# Patient Record
Sex: MALE | Race: WHITE
[De-identification: names, ages, dates, MRNs, and addresses within clinical notes are randomized per-mention and may not be internally consistent; named-entity substitution may affect disease eponyms.]

---

## 2017-12-25 ENCOUNTER — HOSPITAL ENCOUNTER (INPATIENT)
Dept: HOSPITAL 95 - ER | Age: 62
LOS: 7 days | Discharge: HOME | DRG: 281 | End: 2018-01-01
Attending: FAMILY MEDICINE | Admitting: FAMILY MEDICINE
Payer: COMMERCIAL

## 2017-12-25 VITALS — WEIGHT: 237.44 LBS | BODY MASS INDEX: 35.17 KG/M2 | HEIGHT: 69.02 IN

## 2017-12-25 DIAGNOSIS — Z86.010: ICD-10-CM

## 2017-12-25 DIAGNOSIS — Z91.14: ICD-10-CM

## 2017-12-25 DIAGNOSIS — Z79.899: ICD-10-CM

## 2017-12-25 DIAGNOSIS — R00.0: ICD-10-CM

## 2017-12-25 DIAGNOSIS — I25.10: ICD-10-CM

## 2017-12-25 DIAGNOSIS — K21.9: ICD-10-CM

## 2017-12-25 DIAGNOSIS — G47.33: ICD-10-CM

## 2017-12-25 DIAGNOSIS — F17.220: ICD-10-CM

## 2017-12-25 DIAGNOSIS — E78.00: ICD-10-CM

## 2017-12-25 DIAGNOSIS — E11.9: ICD-10-CM

## 2017-12-25 DIAGNOSIS — I21.4: ICD-10-CM

## 2017-12-25 DIAGNOSIS — I50.40: ICD-10-CM

## 2017-12-25 DIAGNOSIS — I42.9: ICD-10-CM

## 2017-12-25 DIAGNOSIS — I11.0: ICD-10-CM

## 2017-12-25 DIAGNOSIS — N17.9: ICD-10-CM

## 2017-12-25 DIAGNOSIS — E03.9: ICD-10-CM

## 2017-12-25 DIAGNOSIS — Z79.84: ICD-10-CM

## 2017-12-25 DIAGNOSIS — Z79.82: ICD-10-CM

## 2017-12-25 DIAGNOSIS — D50.0: ICD-10-CM

## 2017-12-25 DIAGNOSIS — E66.9: ICD-10-CM

## 2017-12-25 DIAGNOSIS — G89.29: ICD-10-CM

## 2017-12-25 DIAGNOSIS — I48.92: Primary | ICD-10-CM

## 2017-12-25 DIAGNOSIS — M54.9: ICD-10-CM

## 2017-12-25 DIAGNOSIS — I44.30: ICD-10-CM

## 2017-12-25 LAB
ALBUMIN SERPL BCP-MCNC: 3.4 G/DL (ref 3.4–5)
ALBUMIN/GLOB SERPL: 1 {RATIO} (ref 0.8–1.8)
ALT SERPL W P-5'-P-CCNC: 115 U/L (ref 12–78)
ANION GAP SERPL CALCULATED.4IONS-SCNC: 9 MMOL/L (ref 6–16)
AST SERPL W P-5'-P-CCNC: 98 U/L (ref 12–37)
BASOPHILS # BLD AUTO: 0.02 K/MM3 (ref 0–0.23)
BASOPHILS NFR BLD AUTO: 0 % (ref 0–2)
BILIRUB SERPL-MCNC: 0.4 MG/DL (ref 0.1–1)
BUN SERPL-MCNC: 16 MG/DL (ref 8–24)
CALCIUM SERPL-MCNC: 8.5 MG/DL (ref 8.5–10.1)
CHLORIDE SERPL-SCNC: 104 MMOL/L (ref 98–108)
CO2 SERPL-SCNC: 26 MMOL/L (ref 21–32)
CREAT SERPL-MCNC: 1.29 MG/DL (ref 0.6–1.2)
DEPRECATED RDW RBC AUTO: 55.9 FL (ref 35.1–46.3)
EOSINOPHIL # BLD AUTO: 0.44 K/MM3 (ref 0–0.68)
EOSINOPHIL NFR BLD AUTO: 8 % (ref 0–6)
ERYTHROCYTE [DISTWIDTH] IN BLOOD BY AUTOMATED COUNT: 14.5 % (ref 11.7–14.2)
GLOBULIN SER CALC-MCNC: 3.4 G/DL (ref 2.2–4)
GLUCOSE SERPL-MCNC: 153 MG/DL (ref 70–99)
HCT VFR BLD AUTO: 36.4 % (ref 37–53)
HGB BLD-MCNC: 12.3 G/DL (ref 13.5–17.5)
IMM GRANULOCYTES # BLD AUTO: 0.01 K/MM3 (ref 0–0.1)
IMM GRANULOCYTES NFR BLD AUTO: 0 % (ref 0–1)
LYMPHOCYTES # BLD AUTO: 1.45 K/MM3 (ref 0.84–5.2)
LYMPHOCYTES NFR BLD AUTO: 27 % (ref 21–46)
MAGNESIUM SERPL-MCNC: 2 MG/DL (ref 1.6–2.4)
MCHC RBC AUTO-ENTMCNC: 33.8 G/DL (ref 31.5–36.5)
MCV RBC AUTO: 105 FL (ref 80–100)
MONOCYTES # BLD AUTO: 0.5 K/MM3 (ref 0.16–1.47)
MONOCYTES NFR BLD AUTO: 9 % (ref 4–13)
NEUTROPHILS # BLD AUTO: 2.92 K/MM3 (ref 1.96–9.15)
NEUTROPHILS NFR BLD AUTO: 55 % (ref 41–73)
NRBC # BLD AUTO: 0 K/MM3 (ref 0–0.02)
NRBC BLD AUTO-RTO: 0 /100 WBC (ref 0–0.2)
PLATELET # BLD AUTO: 168 K/MM3 (ref 150–400)
POTASSIUM SERPL-SCNC: 3.8 MMOL/L (ref 3.5–5.5)
PROT SERPL-MCNC: 6.8 G/DL (ref 6.4–8.2)
SODIUM SERPL-SCNC: 139 MMOL/L (ref 136–145)
TSH SERPL DL<=0.005 MIU/L-ACNC: 4.88 UIU/ML (ref 0.36–4.8)

## 2017-12-26 LAB
ANION GAP SERPL CALCULATED.4IONS-SCNC: 7 MMOL/L (ref 6–16)
BASOPHILS # BLD AUTO: 0.04 K/MM3 (ref 0–0.23)
BASOPHILS NFR BLD AUTO: 1 % (ref 0–2)
BUN SERPL-MCNC: 16 MG/DL (ref 8–24)
CALCIUM SERPL-MCNC: 8 MG/DL (ref 8.5–10.1)
CHLORIDE SERPL-SCNC: 107 MMOL/L (ref 98–108)
CHOLEST SERPL-MCNC: 102 MG/DL (ref 50–200)
CHOLEST/HDLC SERPL: 3.1 {RATIO}
CK MB CFR SERPL CALC: 2.1 % (ref 0–4)
CK MB CFR SERPL CALC: 2.5 % (ref 0–4)
CK SERPL-CCNC: 51 U/L (ref 39–308)
CK SERPL-CCNC: 68 U/L (ref 39–308)
CO2 SERPL-SCNC: 26 MMOL/L (ref 21–32)
CREAT SERPL-MCNC: 1.05 MG/DL (ref 0.6–1.2)
DEPRECATED RDW RBC AUTO: 57 FL (ref 35.1–46.3)
EOSINOPHIL # BLD AUTO: 0.4 K/MM3 (ref 0–0.68)
EOSINOPHIL NFR BLD AUTO: 7 % (ref 0–6)
ERYTHROCYTE [DISTWIDTH] IN BLOOD BY AUTOMATED COUNT: 14.8 % (ref 11.7–14.2)
GLUCOSE SERPL-MCNC: 109 MG/DL (ref 70–99)
HCT VFR BLD AUTO: 33.3 % (ref 37–53)
HDLC SERPL-MCNC: 33 MG/DL (ref 39–?)
HGB BLD-MCNC: 11.1 G/DL (ref 13.5–17.5)
IMM GRANULOCYTES # BLD AUTO: 0.01 K/MM3 (ref 0–0.1)
IMM GRANULOCYTES NFR BLD AUTO: 0 % (ref 0–1)
LDLC SERPL CALC-MCNC: 45 MG/DL (ref 0–110)
LDLC/HDLC SERPL: 1.4 {RATIO}
LYMPHOCYTES # BLD AUTO: 1.38 K/MM3 (ref 0.84–5.2)
LYMPHOCYTES NFR BLD AUTO: 25 % (ref 21–46)
MAGNESIUM SERPL-MCNC: 2.2 MG/DL (ref 1.6–2.4)
MCHC RBC AUTO-ENTMCNC: 33.3 G/DL (ref 31.5–36.5)
MCV RBC AUTO: 106 FL (ref 80–100)
MONOCYTES # BLD AUTO: 0.62 K/MM3 (ref 0.16–1.47)
MONOCYTES NFR BLD AUTO: 11 % (ref 4–13)
NEUTROPHILS # BLD AUTO: 3.06 K/MM3 (ref 1.96–9.15)
NEUTROPHILS NFR BLD AUTO: 56 % (ref 41–73)
NRBC # BLD AUTO: 0 K/MM3 (ref 0–0.02)
NRBC BLD AUTO-RTO: 0 /100 WBC (ref 0–0.2)
PLATELET # BLD AUTO: 156 K/MM3 (ref 150–400)
POTASSIUM SERPL-SCNC: 3.7 MMOL/L (ref 3.5–5.5)
PROTHROMBIN TIME: 11.6 SEC (ref 9.7–11.5)
SODIUM SERPL-SCNC: 140 MMOL/L (ref 136–145)
TRIGL SERPL-MCNC: 118 MG/DL (ref 30–160)
TROPONIN I SERPL-MCNC: 0.26 NG/ML (ref 0–0.04)
TROPONIN I SERPL-MCNC: 0.35 NG/ML (ref 0–0.04)
VLDLC SERPL CALC-MCNC: 23 MG/DL (ref 6–32)

## 2017-12-26 PROCEDURE — 5A2204Z RESTORATION OF CARDIAC RHYTHM, SINGLE: ICD-10-PCS | Performed by: INTERNAL MEDICINE

## 2017-12-26 PROCEDURE — B246ZZ4 ULTRASONOGRAPHY OF RIGHT AND LEFT HEART, TRANSESOPHAGEAL: ICD-10-PCS | Performed by: INTERNAL MEDICINE

## 2017-12-27 LAB
ALBUMIN SERPL BCP-MCNC: 3.3 G/DL (ref 3.4–5)
ALBUMIN SERPL BCP-MCNC: 3.4 G/DL (ref 3.4–5)
ALBUMIN/GLOB SERPL: 0.9 {RATIO} (ref 0.8–1.8)
ALBUMIN/GLOB SERPL: 0.9 {RATIO} (ref 0.8–1.8)
ALT SERPL W P-5'-P-CCNC: 84 U/L (ref 12–78)
ALT SERPL W P-5'-P-CCNC: 89 U/L (ref 12–78)
ANION GAP SERPL CALCULATED.4IONS-SCNC: 6 MMOL/L (ref 6–16)
ANION GAP SERPL CALCULATED.4IONS-SCNC: 8 MMOL/L (ref 6–16)
AST SERPL W P-5'-P-CCNC: 39 U/L (ref 12–37)
AST SERPL W P-5'-P-CCNC: 40 U/L (ref 12–37)
BASOPHILS # BLD AUTO: 0.03 K/MM3 (ref 0–0.23)
BASOPHILS NFR BLD AUTO: 1 % (ref 0–2)
BILIRUB SERPL-MCNC: 0.6 MG/DL (ref 0.1–1)
BILIRUB SERPL-MCNC: 0.6 MG/DL (ref 0.1–1)
BUN SERPL-MCNC: 17 MG/DL (ref 8–24)
BUN SERPL-MCNC: 18 MG/DL (ref 8–24)
CALCIUM SERPL-MCNC: 8 MG/DL (ref 8.5–10.1)
CALCIUM SERPL-MCNC: 8.7 MG/DL (ref 8.5–10.1)
CHLORIDE SERPL-SCNC: 105 MMOL/L (ref 98–108)
CHLORIDE SERPL-SCNC: 105 MMOL/L (ref 98–108)
CO2 SERPL-SCNC: 25 MMOL/L (ref 21–32)
CO2 SERPL-SCNC: 27 MMOL/L (ref 21–32)
CREAT SERPL-MCNC: 0.95 MG/DL (ref 0.6–1.2)
CREAT SERPL-MCNC: 0.95 MG/DL (ref 0.6–1.2)
DEPRECATED RDW RBC AUTO: 57.4 FL (ref 35.1–46.3)
EOSINOPHIL # BLD AUTO: 0.5 K/MM3 (ref 0–0.68)
EOSINOPHIL NFR BLD AUTO: 8 % (ref 0–6)
ERYTHROCYTE [DISTWIDTH] IN BLOOD BY AUTOMATED COUNT: 14.8 % (ref 11.7–14.2)
GLOBULIN SER CALC-MCNC: 3.6 G/DL (ref 2.2–4)
GLOBULIN SER CALC-MCNC: 3.8 G/DL (ref 2.2–4)
GLUCOSE SERPL-MCNC: 120 MG/DL (ref 70–99)
GLUCOSE SERPL-MCNC: 98 MG/DL (ref 70–99)
HCT VFR BLD AUTO: 37.6 % (ref 37–53)
HGB BLD-MCNC: 12.3 G/DL (ref 13.5–17.5)
IMM GRANULOCYTES # BLD AUTO: 0.01 K/MM3 (ref 0–0.1)
IMM GRANULOCYTES NFR BLD AUTO: 0 % (ref 0–1)
LYMPHOCYTES # BLD AUTO: 1.46 K/MM3 (ref 0.84–5.2)
LYMPHOCYTES NFR BLD AUTO: 23 % (ref 21–46)
MCHC RBC AUTO-ENTMCNC: 32.7 G/DL (ref 31.5–36.5)
MCV RBC AUTO: 107 FL (ref 80–100)
MONOCYTES # BLD AUTO: 0.65 K/MM3 (ref 0.16–1.47)
MONOCYTES NFR BLD AUTO: 10 % (ref 4–13)
NEUTROPHILS # BLD AUTO: 3.8 K/MM3 (ref 1.96–9.15)
NEUTROPHILS NFR BLD AUTO: 59 % (ref 41–73)
NRBC # BLD AUTO: 0 K/MM3 (ref 0–0.02)
NRBC BLD AUTO-RTO: 0 /100 WBC (ref 0–0.2)
PLATELET # BLD AUTO: 167 K/MM3 (ref 150–400)
POTASSIUM SERPL-SCNC: 4.3 MMOL/L (ref 3.5–5.5)
POTASSIUM SERPL-SCNC: 4.3 MMOL/L (ref 3.5–5.5)
PROT SERPL-MCNC: 7 G/DL (ref 6.4–8.2)
PROT SERPL-MCNC: 7.1 G/DL (ref 6.4–8.2)
PROTHROMBIN TIME: 13.2 SEC (ref 9.7–11.5)
SODIUM SERPL-SCNC: 136 MMOL/L (ref 136–145)
SODIUM SERPL-SCNC: 140 MMOL/L (ref 136–145)
TROPONIN I SERPL-MCNC: 0.37 NG/ML (ref 0–0.04)

## 2017-12-28 LAB
ALBUMIN SERPL BCP-MCNC: 3.3 G/DL (ref 3.4–5)
ALBUMIN/GLOB SERPL: 0.9 {RATIO} (ref 0.8–1.8)
ALT SERPL W P-5'-P-CCNC: 61 U/L (ref 12–78)
ANION GAP SERPL CALCULATED.4IONS-SCNC: 6 MMOL/L (ref 6–16)
AST SERPL W P-5'-P-CCNC: 27 U/L (ref 12–37)
BASOPHILS # BLD AUTO: 0.02 K/MM3 (ref 0–0.23)
BASOPHILS NFR BLD AUTO: 0 % (ref 0–2)
BILIRUB SERPL-MCNC: 0.7 MG/DL (ref 0.1–1)
BUN SERPL-MCNC: 14 MG/DL (ref 8–24)
CALCIUM SERPL-MCNC: 8.9 MG/DL (ref 8.5–10.1)
CHLORIDE SERPL-SCNC: 102 MMOL/L (ref 98–108)
CO2 SERPL-SCNC: 29 MMOL/L (ref 21–32)
CREAT SERPL-MCNC: 1.17 MG/DL (ref 0.6–1.2)
DEPRECATED RDW RBC AUTO: 56.2 FL (ref 35.1–46.3)
EOSINOPHIL # BLD AUTO: 0.59 K/MM3 (ref 0–0.68)
EOSINOPHIL NFR BLD AUTO: 10 % (ref 0–6)
ERYTHROCYTE [DISTWIDTH] IN BLOOD BY AUTOMATED COUNT: 14.5 % (ref 11.7–14.2)
GLOBULIN SER CALC-MCNC: 3.7 G/DL (ref 2.2–4)
GLUCOSE SERPL-MCNC: 112 MG/DL (ref 70–99)
HCT VFR BLD AUTO: 36.4 % (ref 37–53)
HGB BLD-MCNC: 12.1 G/DL (ref 13.5–17.5)
IMM GRANULOCYTES # BLD AUTO: 0.01 K/MM3 (ref 0–0.1)
IMM GRANULOCYTES NFR BLD AUTO: 0 % (ref 0–1)
LYMPHOCYTES # BLD AUTO: 1 K/MM3 (ref 0.84–5.2)
LYMPHOCYTES NFR BLD AUTO: 17 % (ref 21–46)
MCHC RBC AUTO-ENTMCNC: 33.2 G/DL (ref 31.5–36.5)
MCV RBC AUTO: 106 FL (ref 80–100)
MONOCYTES # BLD AUTO: 0.56 K/MM3 (ref 0.16–1.47)
MONOCYTES NFR BLD AUTO: 10 % (ref 4–13)
NEUTROPHILS # BLD AUTO: 3.66 K/MM3 (ref 1.96–9.15)
NEUTROPHILS NFR BLD AUTO: 63 % (ref 41–73)
NRBC # BLD AUTO: 0 K/MM3 (ref 0–0.02)
NRBC BLD AUTO-RTO: 0 /100 WBC (ref 0–0.2)
PLATELET # BLD AUTO: 162 K/MM3 (ref 150–400)
POTASSIUM SERPL-SCNC: 4.1 MMOL/L (ref 3.5–5.5)
PROT SERPL-MCNC: 7 G/DL (ref 6.4–8.2)
SODIUM SERPL-SCNC: 137 MMOL/L (ref 136–145)

## 2017-12-29 LAB
ANION GAP SERPL CALCULATED.4IONS-SCNC: 8 MMOL/L (ref 6–16)
BUN SERPL-MCNC: 17 MG/DL (ref 8–24)
CALCIUM SERPL-MCNC: 8.4 MG/DL (ref 8.5–10.1)
CHLORIDE SERPL-SCNC: 99 MMOL/L (ref 98–108)
CO2 SERPL-SCNC: 29 MMOL/L (ref 21–32)
CREAT SERPL-MCNC: 1.09 MG/DL (ref 0.6–1.2)
GLUCOSE SERPL-MCNC: 94 MG/DL (ref 70–99)
MAGNESIUM SERPL-MCNC: 2.3 MG/DL (ref 1.6–2.4)
POTASSIUM SERPL-SCNC: 3.7 MMOL/L (ref 3.5–5.5)
SODIUM SERPL-SCNC: 136 MMOL/L (ref 136–145)

## 2017-12-30 LAB
ANION GAP SERPL CALCULATED.4IONS-SCNC: 7 MMOL/L (ref 6–16)
BUN SERPL-MCNC: 17 MG/DL (ref 8–24)
CALCIUM SERPL-MCNC: 8.2 MG/DL (ref 8.5–10.1)
CHLORIDE SERPL-SCNC: 101 MMOL/L (ref 98–108)
CO2 SERPL-SCNC: 29 MMOL/L (ref 21–32)
CREAT SERPL-MCNC: 1.03 MG/DL (ref 0.6–1.2)
GLUCOSE SERPL-MCNC: 95 MG/DL (ref 70–99)
POTASSIUM SERPL-SCNC: 3.9 MMOL/L (ref 3.5–5.5)
SODIUM SERPL-SCNC: 137 MMOL/L (ref 136–145)

## 2017-12-31 LAB
ANION GAP SERPL CALCULATED.4IONS-SCNC: 7 MMOL/L (ref 6–16)
BUN SERPL-MCNC: 16 MG/DL (ref 8–24)
CALCIUM SERPL-MCNC: 8.3 MG/DL (ref 8.5–10.1)
CHLORIDE SERPL-SCNC: 103 MMOL/L (ref 98–108)
CO2 SERPL-SCNC: 29 MMOL/L (ref 21–32)
CREAT SERPL-MCNC: 1.1 MG/DL (ref 0.6–1.2)
GLUCOSE SERPL-MCNC: 102 MG/DL (ref 70–99)
POTASSIUM SERPL-SCNC: 4.1 MMOL/L (ref 3.5–5.5)
SODIUM SERPL-SCNC: 139 MMOL/L (ref 136–145)

## 2025-01-20 NOTE — NUR
NEW ADMIT
 
PT TRANSFERRED TO  355 AT 2120. PT AOX4, CALM, AND COOPERATIVE. PT ABLE TO
MAKE NEEDS KNOWN. PT NEEDED TO USE BATHROOM, SO HE WAS TAKEN THERE WITH 1P
ASSIST AND FWW. PT'S BED LOCKED IN LOWEST POSITION AND PT EDUCATED ABOUT ROOM
AND CALL LIGHT. THIS RN TO ASSUME CARE.

## 2025-01-21 NOTE — NUR
SHIFT SUMMARY
PATIENT WITH SHORTNESS OF BREATH ON EXERTION BUT RESOLVES QUICKLY. HE IS UP TO
BATHROOM SBA WITH WALKER. TOLERATING FULL LIQUID DIET. BED IN LOW POSITION,
CALL LIGHT IN REACH. PATIENT ABLE TO MAKE NEEDS KNOWN.

## 2025-01-22 NOTE — NUR
01/22/25 1638 Ayla Reyes
1634-History, Chart, Medications and Allergies reviewed before start
of procedure.MONITOR INTACT WITH CONTINUOUS PULSE OXIMETRY, CONTINUOUS
END TITAL CO2, AND INTERMITTENT BLOOD PRESSURE.3-LEAD EKG REVIEWED
WITH PHYSICIAN PRIOR TO START OF PROCEDURE.O2 VIA POM INTACT
THROUGHOUT SEDATION/PROCEDURE.Bite Block Placed. FATEMEH BAUTISTA
PROVIDING MAC-SEE ANESTHESIA RECORD.

## 2025-01-22 NOTE — NUR
SHIFT SUMMARY
PT A/Ox4 T/O SHIFT. PLEASANT AND COOPERATIVE WITH CARE. PARACENTESIS COMPLETED
TODAY, 8 L REMOVED. ALBUMIN GIVEN X2. BOWEL PREP COMPLETED FOR UPPER AND LOWER
SCROPE. PT RETURNED FROM DAY SURGERY TO ROOM AT APPROX 1730. PT REPORTS
FEELING "LOOPY" AFTER PROCEDURE. TRASNFERED VIA SLIDE SHEET AND 4 STAFF
MEMBERS. PT AWAKE AND ALERT. FAMILY NOW AT BEDSIDE. PT COMMUNICATING AND
MAKING JOKES. PT ABLE TO SWALLOW LIQUIDS AND PILLS WITHOUT DIFFICULTY. PT
CURRENTLY RESTING IN HOSPITAL BED - CALL LIGHT WITHIN PT REACH, BED IN LOWEST
POSITION. FAMILY REMAINS AT BEDSIDE.

## 2025-01-22 NOTE — NUR
SHIFT SUMMARY NOC
 
PT A/O X 4. PLEASANT AND COOPERATIVE WITH CARE. VSS. HS .
PT HAD DIET CHANGED FROM CLEAR LIQUIDS @ 2300 TO NPO WITH SIPS/CHIPS.
DUE TO PT HAVING UPPER SCOPE TODAY. PT RECEIVED ONE TIME DOSE OF MIRALAX AND
WILL RECEIVE FIRST DOSE OF SUREPREP @ 0600 TO PREPARE FOR LOWER SCOPE. PT WILL
BE NPO @ 1200.  PT ALSO HAS PARACENTESIS SCHEDULED FOR TODAY TO REMOVE EXCESS
FLUID AROUND ABDOMEN. PT WEARING CPAP FOR SLEEP SPO2 >92% ON BIOX. 2100
CBC SHOWED HGB 8.7. DR SCHROEDER CAME TO SPEAK TO PT ABOUT PLAN FOR TODAY
DURING SHIFT CHANGE LAST NIGHT. PT ON TELE SINUS/BBB IN 80'S. PT
CURRENTLY RESTING WITH BED IN LOWEST POSITION, AND CALL LIGHT WITHIN REACH.

## 2025-01-22 NOTE — NUR
ANESTHESIOLOGIST AT BEDSIDE AND PATIENT STATED THAT HE WAS STILL DRINKING
WATER ABOUT 30 MINUTES AGO. PER PROTOCOL, WILL HOLD PROCEDURE UNTIL 1630. DR SCHROEDER INFORMED. CALLED AND EDUCATED RN ON FLOOR REGARDING NPO STATUS.

## 2025-01-23 NOTE — NUR
SHIFT SUMMARY
NO ACUTE CHANGES. 1 UNIT OF BLOOD INFUSED THIS AM, PT TOLERATED TRANSFUSION
WITHOUT DIFFICULTY OR ANY ADVERSE REACTIONS. HGB NOW 7.5.V VSS, PT SATING
ABOVE 92% ON 2 L/MIN VIA NC. TELE IN PLACE, SR @ 96 BPM. PT DENIES PAIN. PT
HAS TOLERATED GETTING UP TO RESTROOM WITH SBA AND FWW AND SHOWERED TODAY WITH
MINIMAL ASSISTANCE. PT CONTINUES TO TOLERATE INTAKE. PT CURRENTLY RESTING IN
BED WITH BED IN LOWEST POSITION AND CALL LIGHT WITHIN REACH, PT USES CALL
LIGHT APPROPRIATELY. LOTS OF VISITORS IN AND OUT OF ROOM TODAY, PT IN GOOD
SPIRITS.

## 2025-01-23 NOTE — NUR
SHIFT SUMMARY
 
NO ACUTE EVENTS DURING THIS SHIFT. TELE: [place] @107, HS B. PT
REFUSED CPAP DURING NIGHT HRS, WEARING NASAL CANNULA @2L, CONTINUOUS PULSE OX
SAT'S>94%. PT DENIES PAIN/DISCOMFORT/SOB. PT IS A/O X4, PLEASANT, COOPERATIVE
WITH CARE. @HS WIFE AND SISTER-IN-LAW BY THE BEDSIDE. PT AMBULATING WITH FWW
AND SBA TO THE RESTROOM, STEADY GAIT DURING THE NIGHT HRS.  BED AT THE LOWEST
POSITION, CALL LIGHT WITHIN REACH. PT IS ABLE TO MAKE HIS NEEDS KNOWN.

## 2025-01-24 NOTE — NUR
CALL FROM [facility] ALICIA DOMINGUEZ PT FAMILY IS REQUESTING INFORMATION ON BECOMING A
POA. MET WITH SON BLAZE AND PT ABRAHAM AND DISCUSSED PROCESS OF BECOMING POA. ABRAHAM
DID AGREE TO HIS SON BECOMING POA. ABRAHAM IS A/O X 4 AT THIS TIME AND ABLE TO
MAKE DECISIONS. GAVE BLAZE PAPERWORD FOR ADVANCE DIRECTIVE, FINANCIAL POA AND
POLST FORM. EDUCATED BLAZE AND BILL ON THESE FORMS.  BLAZE REQUESTED TO BE ADDED
TO HAVE ACCESS TO PT RECORDS. ALSO PROVIDED PAPERWORK TO HAVE BILL SIGN AND
ADVISED TO TAKE TO ADMITTING TO PROCESS. BLAZE/PT BILL V/U. DENIED ANY OTHER
CONCERNS/QUESTIONS.

## 2025-01-24 NOTE — NUR
SHIFT SUMMARY
 
NO ACUTE EVENTS DURING THIS SHIFT. BP@ HS: 82/55, P. 78. PT DENIES
SOB/DIZZINESS. PT REMAINED IN BED, RESTING T/O THIS SHIFT.  PT REFUSED CPAP AT
HS, NASAL CANNULA O2 SAT'S> 94% WITH CONTINUOUS PULSE OXIMETER. PT DENIES
PAIN. HS B. TELE: SR @78 WITH BB. LE EDEMA +2 BILATERALLY. BED AT THE
LOWEST POSITION, CALL LIGHT W/I REACH. PT IS A/O X4, ABLE TO MAKE HIS NEEDS
KNOWN, PLEASANT AND COOPERATIVE WITH CARE.

## 2025-01-24 NOTE — NUR
SHIFT SUMMARY:
PATIENT A/OX4, PLEASANT AND COOPERATIVE c CARE. PATIENT DENIES CP/PRESSURE,
SOB, N/V AND DIZZINESS. PATIENT ON TELE, SR HR IN THE MID 80'S BPM c
OCCASIONAL BBB/PVC. PATIENT SCHEDULED ALDACTONE, COREG AND LASIX WAS HELD THIS
AM D/T SOFT BP, NOTIFIED DR. HINDS DURING AM ROUNDING AND ASKED TO PLACED
PARAMETERS OF WHEN TO HOLD THE MEDS. DR. HINDS PLACED PARAMETERS. PATIENT
RECEIVED HIS DOSE OF ALDACTONE, LASIX AND COREG THIS PM. PATIENT BP STILL SOFT
BUT STABLE. PATIENT HAS PLUS 1 EDEMA TO ARAMIS, PLUS 3 TO BLE'S/FEET. PATIENT ON
2L O2 VIA NC, SATTING 94-96%. PATIENT HAS GOOD APPETITE, CONTINENT OF BLADDER,
AMBULATES TO BATHROOM/BACK IN BED c SBA/FWW AND NO BM THIS SHIFT. PATIENT
FAMILY AT BEDSIDE ON/OFF T/O SHIFT. VITAL SIGNS REVIEWED. CALL LIGHT IN REACH.

## 2025-01-25 NOTE — NUR
SHIFT SUMMARY:
PATIENT HAD HIS PARACENTHESIS DONE TODAY c 5L FLUID TAKEN OUT, WAS ACCESS TO L
SIDE OF HIS ABDOMEN c BAND AID IN PLACED, NO DRAINAIGE NOTED TO SITE. PATIENT
ABDOMEN SOFT AND APPEARS LESS DISTENDED. POST-OP VITALS TAKEN. PATIENT
RECEIVED 2 BAGS OF ALBUMIEN PER ORDER. PATIENT HAS HAD SOFT BP, COREG DOSE AT
1700 WAS HELD PER PARAMETERS. PATIENT HAS GOOD APPETITE, CONTINENT OF BLADDER,
NO BM THIS SHIFT. DR. ELDA HENRIQUEZ ON PATIENT THIS PM, PLACED NEW ORDER IN
EMAR. PATIENT RECEIVED OT DOSE OF MIRALAX. PATIENT DENIES CP/PRESSURE, SOB,
N/V AND DIZZINESS. PATIENT STILL ON TELE, SR HR IN THE 80'S BPM c OCCASIONAL
BBB/PVC. PATIENT ON 2L O2 VIA NC SATTING %.  PATIENT A/OX4, CALM,
PLEASANT AND COOPERATIVE c CARE. CALL LIGHT IN REACH.

## 2025-01-25 NOTE — NUR
SHIFT SUMMARY
PT ALERT ORIENTED X 4 ABLE TO VERBALIZE NEEDS CALLS APPROPRIATELY REQUIRES 1
PERSON SBA WITH WALKER TO AMBULATE TO THE BATHROOM. NO C/O PAIN. NO C/O CHEST
PAIN OR TIGHTNESS OR SOB. ABDOMEN REMAINS DISTENDED AND FIRM. BP REMAINS SOFT
AT 95/51. FS DONE AC AND HS . REMAINS WITH EDEMA TO BLE. HES REQUESTING
TO BE CHANGED TO A DNR AND POLST WAS FILLED OUT. HE HAS REMAINED TURNED OFF OF
HIS BUTTOCKS TO KEEP PRESSURE OFF. HES RESTING AT THIS TIME WITH CALL LIGHT IN
REACH

## 2025-01-25 NOTE — NUR
MET WITH PATIENT AND HIS SPOUSE. REVIEWED POLST AND FILLED OUT REVIEWED
OPTIONS. THEY ELECTECTED DNR AND SELECTIVE TREATMENT. LEFT IN CHART AND
NOTIFIED PROVIDER. CODE STATUS ORDER CHANGED.

## 2025-01-26 NOTE — NUR
SHIFT SUMMARY:
PATIENT SCHEDULED AM COREG, ALDACTONE AND LASIX WAS HELD D/T SBP IN THE HIGH
80'S c A MAP OF 60-62. DR. HINDS AND DR. RYAN ARE AWARE OF THIS CONCERNED.
THIS RN SPOKE TO DR. SCHROEDER THIS PM AND HE MADE SOME CHANGES OF PATIENT
MEDICATION.  PATIENT STILL NOT HAVING BM, BUT REPORTS PASSING GAS. PATIENT
RECEIVED BOWEL CARE PER ORDER. PATIENT IS EATING AND DRINKING WELL, W/OUT ANY
DIFFICULTIES.  PATIENT STILL ON TELE, SR HR IN THE 80'S BPM c BBB/PVC, EDEMA
TO BUE/BLE'S.  PATIENT CONTINENT OF BLADDER AND AMBULATES TO BATHROOM c
SBA/FWW T/O SHIFT. PATIENT RESTING IN BED ON/OFF. CALL LIGHT IN REACH.

## 2025-01-27 NOTE — NUR
SHIFT SUMMARY
MR HASKINS IS OX4. HE WAS WEANED DOWN TO 1L NC, AMBULATED ON ROOM AIR IN
THE HALLS WITH WALKER AND GAITBELT, 1 PERSON ASSISTANCE. SAT DROPPED TO 91%,
PT FELT SOB WITH WALKING AND WHEELED BACK TO BED. HE WAS ON 1L NC WHILE
SITTING IN CHAIR FOR A FEW HOURS, REQUIRED 2L NC WHEN LYING IN BED TO MAINTAIN
SATS IN THE 90S ON CONTINUOUS PULSE OX. AMBUALATED IN TO THE BATHROOM BUT NO
BM TODAY. PO FLUIDS AND MOVEMENT ENVOURAGED. HE HAD 2 SCHEDULED MIRALAX PLUS
1 PRN MIRALAX THIS SHIFT. RESTING IN BED NOW, BED LOW, CALL LIGHT IN REACH.

## 2025-01-28 NOTE — NUR
SHIFT SUMMARY AND DISCHARGE
 
PATIENT ALERT AND INTERACTIVE.  PATIENT AMBULATING WITH WALKER IN ROOM.
PATIENT NEEDING O2 FOR DISCHARGE.  [facility] DELIVERED O2 PRIOR TO DISCHARGE.
DISCHARGE INSTRUCTIONS REVIEWED WITH PATIENT AND FAMILY.  IV AND TELEMETRY
REMOVED PRIOR TO DISCHARGE.  PATIENT TRANSPORTED OUT VIA WHEELCHAIR BY [facility].

## 2025-01-28 NOTE — NUR
NIGHT SHIFT SUMMARY:
 
PT ADMITTED FOR LIVER FAILURE. DNR. VSS. A&O X4. MAKES NEEDS KNOWN TO STAFF.
INDEPENDENT WITH BED MOBILITY.  AMBULATED TO TOILET WITH 1 PERSON SBA. NO BM
THIS SHIFT DESPITE BOWEL CARE. ABD DISTENDED, HYPERACTIVE BOWEL TONES. BLE
WITH 3 TO 4+ EDEMA. BUE WITH 1 TO 2+ EDEMA. NO ACUTE CHANGES THIS SHIFT. BED
IN LOWEST POSITION. CALL LIGHT IN REACH. CARES CONTINUE AS ORDERED.